# Patient Record
Sex: MALE | Race: BLACK OR AFRICAN AMERICAN | NOT HISPANIC OR LATINO | URBAN - NONMETROPOLITAN AREA
[De-identification: names, ages, dates, MRNs, and addresses within clinical notes are randomized per-mention and may not be internally consistent; named-entity substitution may affect disease eponyms.]

---

## 2020-11-21 ENCOUNTER — IMPORTED ENCOUNTER (OUTPATIENT)
Dept: URBAN - NONMETROPOLITAN AREA CLINIC 1 | Facility: CLINIC | Age: 70
End: 2020-11-21

## 2020-11-21 PROBLEM — H26.491: Noted: 2020-11-21

## 2020-11-21 PROBLEM — Z96.1: Noted: 2020-11-21

## 2020-11-21 PROCEDURE — 66821 AFTER CATARACT LASER SURGERY: CPT

## 2020-11-21 PROCEDURE — 99204 OFFICE O/P NEW MOD 45 MIN: CPT

## 2020-11-21 NOTE — PATIENT DISCUSSION
PCO OD-Explained PCO and RBAs of YAG Capsulotomy to pt. -Pt elects to proceed.  YAG Caps OD with F/U in 1-2 weeks with Dr. Ashwin Mendoza

## 2020-12-08 ENCOUNTER — IMPORTED ENCOUNTER (OUTPATIENT)
Dept: URBAN - NONMETROPOLITAN AREA CLINIC 1 | Facility: CLINIC | Age: 70
End: 2020-12-08

## 2020-12-08 PROCEDURE — 99024 POSTOP FOLLOW-UP VISIT: CPT

## 2020-12-08 NOTE — PATIENT DISCUSSION
Pseudophakia OU s/p Yag PC OD- Doing well following CE IOL OU and Yag PC OD.- Recommend Pred Forte QID OD x 2 weeks. - Recommend follow up in 2 weeks.

## 2022-04-09 ASSESSMENT — VISUAL ACUITY
OD_PH: 20/30
OD_CC: 20/50
OD_CC: 20/70-1
OS_CC: 20/25-
OD_PH: 20/40-1
OS_CC: 20/25-

## 2022-04-09 ASSESSMENT — TONOMETRY
OD_IOP_MMHG: 14
OS_IOP_MMHG: 14
OS_IOP_MMHG: 16
OD_IOP_MMHG: 16